# Patient Record
Sex: MALE | Race: WHITE | Employment: FULL TIME | URBAN - METROPOLITAN AREA
[De-identification: names, ages, dates, MRNs, and addresses within clinical notes are randomized per-mention and may not be internally consistent; named-entity substitution may affect disease eponyms.]

---

## 2018-11-29 ENCOUNTER — TELEPHONE (OUTPATIENT)
Dept: FAMILY MEDICINE CLINIC | Age: 38
End: 2018-11-29

## 2018-11-29 RX ORDER — AMOXICILLIN AND CLAVULANATE POTASSIUM 875; 125 MG/1; MG/1
1 TABLET, FILM COATED ORAL EVERY 12 HOURS
Qty: 20 TAB | Refills: 0 | Status: SHIPPED | OUTPATIENT
Start: 2018-11-29 | End: 2018-12-09

## 2018-11-30 NOTE — TELEPHONE ENCOUNTER
Subjective:   Chief complaint: Right ear pain and throat pain    He  is a 43 y.o. male who presents for evaluation of:  Patient presents for evaluation of ongoing right ear pain with throat pain. Symptoms initially started on 11/17/18 with fever, chills, sore throat, and myalgias. Most of the symptoms significantly improved after 1-2 days. Patient then noticed significant throat pain and ear pain a few days ago. His right ear pain was so bad that it woke him in the night. He took ibuprofen which he has not required for the past 12 years as he has a high pain tolerance. He has continued to have significant right ear pain and has been taking ibuprofen 200 mg every 3-4 hours. He continues to also have sore throat and pain with swallowing in particular. He also reports some trismus symptoms with opening his mouth and chewing. He denies any coughing, wheezing, shortness of breath, rhinorrhea, nasal congestion, headaches, weight loss, n/v/d. He does report having a few sick contacts including his son who had similar symptoms at the onset but has cleared all of his symptoms. He has been getting good rest and his energy levels have improved after his initial symptoms started. ROS  Gen - no fever/chills  Resp - no dyspnea or cough  CV - no chest pain or ALANIS  Rest per HPI    No past medical history on file. Past Surgical History:   Procedure Laterality Date    HX HEENT      Nasal surgery     No current outpatient medications on file prior to visit. No current facility-administered medications on file prior to visit. Objective:   Physical Examination:  General appearance - alert, well appearing, and in no distress  Eyes -sclera anicteric  HEENT -right TM bulging with no erythema. Left TM normal.  Posterior oropharynx with significant erythema and edema on right side as well as tonsillar swelling.   Mild trismus noted with opening mouth  Psych-normal mood and affect    Assessment/ Plan:   Pharyngitis and Tonsillitis - Given duration of sx, sig pain, and double sickening, will treat with antibiotics. Discussed side effects of Augmentin and patient agrees to use a full 10-day course. If no improvement in symptoms, patient will return for throat culture. We also discussed the episodic hoarseness that he has noticed over the past 3 years and may plan to have him see ENT for laryngoscopy. Follow-up in 10 days.

## 2018-12-19 ENCOUNTER — OFFICE VISIT (OUTPATIENT)
Dept: FAMILY MEDICINE CLINIC | Age: 38
End: 2018-12-19

## 2018-12-19 VITALS
DIASTOLIC BLOOD PRESSURE: 75 MMHG | SYSTOLIC BLOOD PRESSURE: 126 MMHG | OXYGEN SATURATION: 99 % | BODY MASS INDEX: 22.36 KG/M2 | TEMPERATURE: 96.5 F | HEART RATE: 56 BPM | WEIGHT: 151 LBS | HEIGHT: 69 IN | RESPIRATION RATE: 18 BRPM

## 2018-12-19 DIAGNOSIS — Z00.00 WELL ADULT EXAM: ICD-10-CM

## 2018-12-19 DIAGNOSIS — J03.01 RECURRENT STREPTOCOCCAL TONSILLITIS: ICD-10-CM

## 2018-12-19 DIAGNOSIS — Z22.338 STREPTOCOCCUS A CARRIER OR SUSPECTED CARRIER: ICD-10-CM

## 2018-12-19 DIAGNOSIS — Z22.338 STREPTOCOCCUS A CARRIER OR SUSPECTED CARRIER: Primary | ICD-10-CM

## 2018-12-19 NOTE — PROGRESS NOTES
Subjective:     Chief Complaint   Patient presents with   [facility] patient        He  is a 43 y.o. male who presents for evaluation of:  New pt to [facility] care. He has a hx of repetitive strep infections over the past 3 yrs. Started back in 11/2015 while traveling in [place]. Tx at Pt 1st with Augmentin 500/125 twice daily times 10 days. Then again had strep in 12/2015. Tx again with Augmentin at higher dose of 875/125 twice daily times 10 days with full resolution. Then again had pharyngitis symptoms in 2/2016 and was treated with Augmentin again at that point. Then again had an episode with pharyngitis symptoms in 11/2016he decided not to tx and symptoms resolved eventually. Lastly, he developed a right tonsillar abscess in 11/2018 with symptoms and photographs convincing for abscess. Treated this with Augmentin 875/125 twice daily times 10 days with full resolution. He went to see ENT after this and had a conversation about potentially treating patient as a carrier for strep as well as treating all of his family members. He currently has a prescription for amoxicillin 500 twice daily times 30 days but has not filled this yet as he wanted to discuss the overall plan before starting on any medications. After his visit with the ENT, his whole family had testing for strep and was + in daughter and neg in 2 boys and wife but cultures are being sent off. The pediatrician who tested his family recommended treating his whole family with Augmentin followed by a course of another antibiotic (potentially clindamycin). Patient had previously reported having some hoarseness for the past 3 years ever since his initial episode of strep started. These episodes of hoarseness seem to come and go. He briefly mentioned this with his ENT but there is no further workup recommended at that time and patient has not had a laryngoscopy.     ROS  Gen - no fever/chills  Resp - no dyspnea or cough  CV - no chest pain or ALANIS  Rest per HPI    History reviewed. No pertinent past medical history. Past Surgical History:   Procedure Laterality Date    HX HEENT      Nasal surgery     No current outpatient medications on file prior to visit. No current facility-administered medications on file prior to visit. Objective:     Vitals:    12/19/18 0841   BP: 126/75   Pulse: (!) 56   Resp: 18   Temp: 96.5 °F (35.8 °C)   TempSrc: Oral   SpO2: 99%   Weight: 151 lb (68.5 kg)   Height: 5' 9\" (1.753 m)     Physical Examination:  General appearance - alert, well appearing, and in no distress  Eyes -sclera anicteric  HEENT -TMs nml, oropharynx nml  Neck - supple, no significant adenopathy, no thyromegaly  Chest - clear to auscultation, no wheezes, rales or rhonchi, symmetric air entry  Heart - normal rate, regular rhythm, normal S1, S2, no murmurs, rubs, clicks or gallops  Neurological - alert, oriented, normal speech, no focal findings or movement disorder noted  Psychnormal mood and affect    Assessment/ Plan:   Diagnoses and all orders for this visit:    1. Streptococcus A carrier or suspected carrier  2. Recurrent streptococcal tonsillitis  We discussed his case at length and are opting to perform a throat culture today. If this comes back positive, we discussed planning for a longer course of antibiotics for him as well as considering treatment for the rest of his family. -     UPPER RESPIRATORY CULTURE    3. Well adult examobtaining labs and will plan for full exam in the future. -     CBC W/O DIFF; Future  -     METABOLIC PANEL, COMPREHENSIVE; Future  -     LIPID PANEL; Future  -     TSH 3RD GENERATION; Future    I have discussed the diagnosis with the patient and the intended plan as seen in the above orders. The patient has received an after-visit summary and questions were answered concerning future plans. I have discussed medication side effects and warnings with the patient as well.   The patient verbalizes understanding and agreement with the plan. Follow-up Disposition:  Return in about 6 months (around 6/19/2019), or if symptoms worsen or fail to improve.

## 2018-12-19 NOTE — PROGRESS NOTES
Chief Complaint   Patient presents with    New Patient    Complete Physical     1. Have you been to the ER, urgent care clinic since your last visit? Hospitalized since your last visit?no    2. Have you seen or consulted any other health care providers outside of the [address] [place] [address] since your last visit? Include any pap smears or colon screening. No    Patient mostly coming in for complete physical. Patient also would like to talk about amoxicillin that was prescribed to him from ENT.

## 2018-12-22 LAB — BACTERIA SPEC RESP CULT: NORMAL

## 2018-12-27 LAB
ALBUMIN SERPL-MCNC: 4.2 G/DL (ref 3.5–5.5)
ALBUMIN/GLOB SERPL: 1.4 {RATIO} (ref 1.2–2.2)
ALP SERPL-CCNC: 77 IU/L (ref 39–117)
ALT SERPL-CCNC: 21 IU/L (ref 0–44)
AST SERPL-CCNC: 20 IU/L (ref 0–40)
BILIRUB SERPL-MCNC: 0.4 MG/DL (ref 0–1.2)
BUN SERPL-MCNC: 20 MG/DL (ref 6–20)
BUN/CREAT SERPL: 21 (ref 9–20)
CALCIUM SERPL-MCNC: 9.6 MG/DL (ref 8.7–10.2)
CHLORIDE SERPL-SCNC: 101 MMOL/L (ref 96–106)
CHOLEST SERPL-MCNC: 150 MG/DL (ref 100–199)
CO2 SERPL-SCNC: 27 MMOL/L (ref 20–29)
CREAT SERPL-MCNC: 0.97 MG/DL (ref 0.76–1.27)
ERYTHROCYTE [DISTWIDTH] IN BLOOD BY AUTOMATED COUNT: 12.9 % (ref 12.3–15.4)
GLOBULIN SER CALC-MCNC: 3 G/DL (ref 1.5–4.5)
GLUCOSE SERPL-MCNC: 104 MG/DL (ref 65–99)
HCT VFR BLD AUTO: 41.4 % (ref 37.5–51)
HDLC SERPL-MCNC: 42 MG/DL
HGB BLD-MCNC: 13.8 G/DL (ref 13–17.7)
LDLC SERPL CALC-MCNC: 94 MG/DL (ref 0–99)
MCH RBC QN AUTO: 28.3 PG (ref 26.6–33)
MCHC RBC AUTO-ENTMCNC: 33.3 G/DL (ref 31.5–35.7)
MCV RBC AUTO: 85 FL (ref 79–97)
PLATELET # BLD AUTO: 183 X10E3/UL (ref 150–379)
POTASSIUM SERPL-SCNC: 5 MMOL/L (ref 3.5–5.2)
PROT SERPL-MCNC: 7.2 G/DL (ref 6–8.5)
RBC # BLD AUTO: 4.87 X10E6/UL (ref 4.14–5.8)
SODIUM SERPL-SCNC: 142 MMOL/L (ref 134–144)
TRIGL SERPL-MCNC: 72 MG/DL (ref 0–149)
TSH SERPL DL<=0.005 MIU/L-ACNC: 1.77 UIU/ML (ref 0.45–4.5)
VLDLC SERPL CALC-MCNC: 14 MG/DL (ref 5–40)
WBC # BLD AUTO: 5.4 X10E3/UL (ref 3.4–10.8)

## 2019-06-05 ENCOUNTER — OFFICE VISIT (OUTPATIENT)
Dept: FAMILY MEDICINE CLINIC | Age: 39
End: 2019-06-05

## 2019-06-05 VITALS
WEIGHT: 149 LBS | HEIGHT: 69 IN | DIASTOLIC BLOOD PRESSURE: 74 MMHG | BODY MASS INDEX: 22.07 KG/M2 | HEART RATE: 56 BPM | TEMPERATURE: 97.2 F | RESPIRATION RATE: 16 BRPM | OXYGEN SATURATION: 98 % | SYSTOLIC BLOOD PRESSURE: 114 MMHG

## 2019-06-05 DIAGNOSIS — J02.9 PHARYNGITIS, UNSPECIFIED ETIOLOGY: Primary | ICD-10-CM

## 2019-06-05 DIAGNOSIS — J03.01 RECURRENT STREPTOCOCCAL TONSILLITIS: ICD-10-CM

## 2019-06-05 LAB
S PYO AG THROAT QL: NEGATIVE
VALID INTERNAL CONTROL?: YES

## 2019-06-05 NOTE — PROGRESS NOTES
Subjective:     Chief Complaint   Patient presents with    Sore Throat      He  is a 35 y.o. male who presents for evaluation of:  Patient reports sore and dry throat x1 week. No significant fever or chills. It initially seemed to get a little bit better but then did not improve. He especially notes pain with swallowing and eating but the pain can be present at rest.  He also notes some left-sided mildly tender lymphadenopathy. Denies any sick contacts with similar symptoms but numerous family members did have a gastroenteritis that lasted nearly a week. He has not been using any NSAIDs. His main concern is with having right peritonsillar abscess in 11/2018 and wanting to make sure that this does not turn into another abscess. ROS  Gen - no fever/chills  Resp - no dyspnea or cough  CV - no chest pain or ALANIS  Rest per HPI    History reviewed. No pertinent past medical history. Past Surgical History:   Procedure Laterality Date    HX HEENT      Nasal surgery     No current outpatient medications on file prior to visit. No current facility-administered medications on file prior to visit.          Objective:     Vitals:    06/05/19 1531   BP: 114/74   Pulse: (!) 56   Resp: 16   Temp: 97.2 °F (36.2 °C)   TempSrc: Oral   SpO2: 98%   Weight: 149 lb (67.6 kg)   Height: 5' 9\" (1.753 m)     Physical Examination:  General appearance - alert, well appearing, and in no distress  Eyes -sclera anicteric  HEENT -TMs nml bilaterally, oropharynx with tonsillar swelling especially on the left side and a white ulcerated patch noted on the lower portion of the left tonsil  Neck - supple, left submandibular adenopathy with tenderness, no thyromegaly  Chest - clear to auscultation, no wheezes, rales or rhonchi, symmetric air entry  Heart - normal rate, regular rhythm, normal S1, S2, no murmurs, rubs, clicks or gallops  Neurological - alert, oriented, normal speech, no focal findings or movement disorder noted  Psych-normal mood and affect    Assessment/ Plan:   Diagnoses and all orders for this visit:    1. Pharyngitis, unspecified etiology  2. Recurrent streptococcal tonsillitis  -Suspect symptoms are viral but given patient's history of a recurrent strep infection, we have obtained a rapid strep test which was negative and sent culture which is still pending at the time of this note. We discussed symptomatic care with salt water gargles, NSAIDs as needed, and getting plenty of rest and fluids. -     UPPER RESPIRATORY CULTURE  -     AMB POC RAPID STREP A    I have discussed the diagnosis with the patient and the intended plan as seen in the above orders. The patient has received an after-visit summary and questions were answered concerning future plans. I have discussed medication side effects and warnings with the patient as well. The patient verbalizes understanding and agreement with the plan. Follow-up and Dispositions    · Return if symptoms worsen or fail to improve.

## 2019-06-08 LAB — BACTERIA SPEC RESP CULT: NORMAL

## 2021-09-08 ENCOUNTER — OFFICE VISIT (OUTPATIENT)
Dept: FAMILY MEDICINE CLINIC | Age: 41
End: 2021-09-08
Payer: COMMERCIAL

## 2021-09-08 VITALS
SYSTOLIC BLOOD PRESSURE: 106 MMHG | WEIGHT: 149.4 LBS | DIASTOLIC BLOOD PRESSURE: 65 MMHG | BODY MASS INDEX: 22.13 KG/M2 | OXYGEN SATURATION: 99 % | HEIGHT: 69 IN | HEART RATE: 56 BPM | RESPIRATION RATE: 16 BRPM | TEMPERATURE: 97.2 F

## 2021-09-08 DIAGNOSIS — F45.8 BRUXISM: ICD-10-CM

## 2021-09-08 DIAGNOSIS — Z80.42 FAMILY HISTORY OF PROSTATE CARCINOMA: ICD-10-CM

## 2021-09-08 DIAGNOSIS — Z00.00 WELL ADULT EXAM: Primary | ICD-10-CM

## 2021-09-08 DIAGNOSIS — Z23 ENCOUNTER FOR IMMUNIZATION: ICD-10-CM

## 2021-09-08 PROCEDURE — 90715 TDAP VACCINE 7 YRS/> IM: CPT | Performed by: FAMILY MEDICINE

## 2021-09-08 PROCEDURE — 90471 IMMUNIZATION ADMIN: CPT | Performed by: FAMILY MEDICINE

## 2021-09-08 PROCEDURE — 99396 PREV VISIT EST AGE 40-64: CPT | Performed by: FAMILY MEDICINE

## 2021-09-08 NOTE — PATIENT INSTRUCTIONS
Vaccine Information Statement    Tdap (Tetanus, Diphtheria, Pertussis) Vaccine: What you need to know     Many Vaccine Information Statements are available in Kiswahili and other languages. See www.immunize.org/vis  Hojas de información sobre vacunas están disponibles en español y en muchos otros idiomas. Visite www.immunize.org/vis    1. Why get vaccinated? Tdap vaccine can prevent tetanus, diphtheria, and pertussis. Diphtheria and pertussis spread from person to person. Tetanus enters the body through cuts or wounds.  TETANUS (T) causes painful stiffening of the muscles. Tetanus can lead to serious health problems, including being unable to open the mouth, having trouble swallowing and breathing, or death.  DIPHTHERIA (D) can lead to difficulty breathing, heart failure, paralysis, or death.  PERTUSSIS (aP), also known as whooping cough, can cause uncontrollable, violent coughing which makes it hard to breathe, eat, or drink. Pertussis can be extremely serious in babies and young children, causing pneumonia, convulsions, brain damage, or death. In teens and adults, it can cause weight loss, loss of bladder control, passing out, and rib fractures from severe coughing. 2. Tdap vaccine     Tdap is only for children 7 years and older, adolescents, and adults. Adolescents should receive a single dose of Tdap, preferably at age 6 or 15 years. Pregnant women should get a dose of Tdap during every pregnancy, to protect the  from pertussis. Infants are most at risk for severe, life-threatening complications from pertussis. Adults who have never received Tdap should get a dose of Tdap. Also, adults should receive a booster dose every 10 years, or earlier in the case of a severe and dirty wound or burn. Booster doses can be either Tdap or Td (a different vaccine that protects against tetanus and diphtheria but not pertussis).      Tdap may be given at the same time as other vaccines. 3. Talk with your health care provider    Tell your vaccine provider if the person getting the vaccine:   Has had an allergic reaction after a previous dose of any vaccine that protects against tetanus, diphtheria, or pertussis, or has any severe, life-threatening allergies.  Has had a coma, decreased level of consciousness, or prolonged seizures within 7 days after a previous dose of any pertussis vaccine (DTP, DTaP, or Tdap).  Has seizures or another nervous system problem.  Has ever had Guillain-Barré Syndrome (also called GBS).  Has had severe pain or swelling after a previous dose of any vaccine that protects against tetanus or diphtheria. In some cases, your health care provider may decide to postpone Tdap vaccination to a future visit. People with minor illnesses, such as a cold, may be vaccinated. People who are moderately or severely ill should usually wait until they recover before getting Tdap vaccine. Your health care provider can give you more information. 4. Risks of a vaccine reaction     Pain, redness, or swelling where the shot was given, mild fever, headache, feeling tired, and nausea, vomiting, diarrhea, or stomachache sometimes happen after Tdap vaccine. People sometimes faint after medical procedures, including vaccination. Tell your provider if you feel dizzy or have vision changes or ringing in the ears. As with any medicine, there is a very remote chance of a vaccine causing a severe allergic reaction, other serious injury, or death. 5. What if there is a serious problem? An allergic reaction could occur after the vaccinated person leaves the clinic. If you see signs of a severe allergic reaction (hives, swelling of the face and throat, difficulty breathing, a fast heartbeat, dizziness, or weakness), call 9-1-1 and get the person to the nearest hospital.    For other signs that concern you, call your health care provider.     Adverse reactions should be reported to the Vaccine Adverse Event Reporting System (VAERS). Your health care provider will usually file this report, or you can do it yourself. Visit the VAERS website at www.vaers. [facility].gov or call 3-438.397.7919. VAERS is only for reporting reactions, and VAERS staff do not give medical advice. 6. The National Vaccine Injury Compensation Program    The [facility] Vaccine Injury Compensation Program (VICP) is a federal program that was created to compensate people who may have been injured by certain vaccines. Visit the VICP website at www.[facility].gov/vaccinecompensation or call 5-405-467-3005 to learn about the program and about filing a claim. There is a time limit to file a claim for compensation. 7. How can I learn more?  Ask your health care provider.  Call your local or state health department.  Contact the Centers for Disease Control and Prevention (CDC):  - Call 2-935.638.1755 (1-800-CDC-INFO) or  - Visit CDCs website at www.cdc.gov/vaccines    Vaccine Information Statement (Interim)  Tdap (Tetanus, Diphtheria, Pertussis) Vaccine  04/01/2020  42 DEVIKA Diggs [id number]   Department of Health and Human Services  Centers for Disease Control and Prevention    Office Use Only

## 2021-09-08 NOTE — PROGRESS NOTES
Alis Wise (: 1980) is a 42 y.o. male, established patient, here for evaluation of the following chief complaint(s):  Complete Physical (Annual)       ASSESSMENT/PLAN: Doing well overall. Checking labs. Encouraged continued monitoring of episodic lymphadenopathy. If any lymph nodes are present for prolonged period, would consider FNA. Checking PSA given family history of prostate cancer in father at the age of 62. Using  for bruxism. He is COVID vaccinated with [employer]. Below is the assessment and plan developed based on review of pertinent history, physical exam, labs, studies, and medications. 1. Well adult exam  -     HEMOGLOBIN A1C WITH EAG; Future  -     LIPID PANEL; Future  -     METABOLIC PANEL, COMPREHENSIVE; Future  -     TSH 3RD GENERATION; Future  -     CBC W/O DIFF; Future  -     URINALYSIS W/ RFLX MICROSCOPIC; Future  -     PSA, DIAGNOSTIC (PROSTATE SPECIFIC AG); Future  2. Encounter for immunization  -     TETANUS, DIPHTHERIA TOXOIDS AND ACELLULAR PERTUSSIS VACCINE (TDAP), IN INDIVIDS. >=7, IM  3. Family history of prostate carcinoma  4. Bruxism      Return in about 1 year (around 2022), or if symptoms worsen or fail to improve. SUBJECTIVE/OBJECTIVE:  He  is a 42 y.o. male who presents for evaluation of: CPE    Doing well today. No complaints. Exercising regularly and eats a healthy diet. Physically feels well. Reviewed last labs in 2018 with no concerns. Has done a lot of traveling with sailing over the past year. R jaw angle with some pain x 1 day. Has had episodic submandibular lymphadenopathy. No sx including fevers, sore throat, rhinorrhea. Fhx of prostate cancer in Dad. Dad also have melanoma (scalp) and then had SCC from base of tongue which he eventually passed away from.     ROS:  Constitutional: negative for fevers, chills and fatigue  Eyes: negative for visual disturbance  Ears, nose, mouth, throat, and face: negative for hearing loss and sore throat  Respiratory: negative for cough or dyspnea on exertion  Cardiovascular: negative for chest pain, dyspnea, palpitations, fatigue  Gastrointestinal: negative for nausea, vomiting, change in bowel habits, diarrhea and abdominal pain  Genitourinary:negative for frequency and dysuria  Integument/breast: History of lipomas and following with dermatology  Hematologic/lymphatic: negative for easy bruising and bleeding  Musculoskeletal:negative for myalgias and muscle weakness. Getting some episodes of neck soreness with sleeping. 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 Found to have some mild nerve impingement. Can be bilat or more on right side.   Neurological: negative for headaches and dizziness  Behavioral/Psych: negative for anxiety and depression     Objective:     Vitals:    09/08/21 1004   BP: 106/65   Pulse: (!) 56   Resp: 16   Temp: 97.2 °F (36.2 °C)   TempSrc: Oral   SpO2: 99%   Weight: 149 lb 6.4 oz (67.8 kg)   Height: 5' 9\" (1.753 m)     Physical Examination:  General appearance - alert, well appearing, and in no distress  Ears - bilat nml TMs  Eyes - sclera anicteric  Nose - normal and patent, no erythema, discharge or polyps  Mouth - mucous membranes moist, pharynx normal without lesions  Neck - supple, no significant adenopathy  Lymphatics - no palpable lymphadenopathy, no hepatosplenomegaly  Chest - clear to auscultation, no wheezes, rales or rhonchi, symmetric air entry  Heart - normal rate, regular rhythm, normal S1, S2, no murmurs, rubs, clicks or gallops  Abdomen - soft, nontender, nondistended, no masses or organomegaly   - not indicated  Neurological - alert, oriented, normal speech, no focal findings or movement disorder noted  Musculoskeletal - no joint tenderness, deformity or swelling  Extremities - no edema  Skin -right posterior thigh lipoma stable, left-sided upper lateral back with small lipoma/cyst  Psych - nml mood and affect    History reviewed. No pertinent past medical history. Past Surgical History:   Procedure Laterality Date    HX HEENT      Nasal surgery     No current outpatient medications on file prior to visit. No current facility-administered medications on file prior to visit. An electronic signature was used to authenticate this note.   -- Joe Starr MD

## 2021-11-30 DIAGNOSIS — R59.0 LAD (LYMPHADENOPATHY), SUBMANDIBULAR: Primary | ICD-10-CM

## 2021-12-02 ENCOUNTER — OFFICE VISIT (OUTPATIENT)
Dept: FAMILY MEDICINE CLINIC | Age: 41
End: 2021-12-02
Payer: COMMERCIAL

## 2021-12-02 VITALS
TEMPERATURE: 97.5 F | OXYGEN SATURATION: 98 % | RESPIRATION RATE: 16 BRPM | HEART RATE: 69 BPM | HEIGHT: 69 IN | DIASTOLIC BLOOD PRESSURE: 63 MMHG | SYSTOLIC BLOOD PRESSURE: 131 MMHG | WEIGHT: 149.2 LBS | BODY MASS INDEX: 22.1 KG/M2

## 2021-12-02 DIAGNOSIS — R59.0 SUBMANDIBULAR LYMPHADENOPATHY: Primary | ICD-10-CM

## 2021-12-02 PROCEDURE — 99213 OFFICE O/P EST LOW 20 MIN: CPT | Performed by: FAMILY MEDICINE

## 2021-12-02 RX ORDER — AMOXICILLIN AND CLAVULANATE POTASSIUM 875; 125 MG/1; MG/1
1 TABLET, FILM COATED ORAL 2 TIMES DAILY
Qty: 20 TABLET | Refills: 0 | Status: SHIPPED | OUTPATIENT
Start: 2021-12-02 | End: 2021-12-12

## 2021-12-02 NOTE — PROGRESS NOTES
Jennifer Lucas (: 1980) is a 42 y.o. male, established patient, here for evaluation of the following chief complaint(s):  No chief complaint on file. ASSESSMENT/PLAN: Doing well overall but ongoing lymphadenopathy. Obtained throat culture today and will treat with Augmentin in the case that this may be infectious given recurrent episodes of pharyngitis in the past.  No clear symptoms on history otherwise. If lymphadenopathy does not improve with antibiotics or if throat culture is unrevealing, will have him move forward with getting FNA. Below is the assessment and plan developed based on review of pertinent history, physical exam, labs, studies, and medications. 1. Submandibular lymphadenopathy  -     US GUIDE FINE NDL ASP W IMAGE; Future  -     CULTURE, THROAT; Future  -     amoxicillin-clavulanate (AUGMENTIN) 875-125 mg per tablet; Take 1 Tablet by mouth two (2) times a day for 10 days. , Normal, Disp-20 Tablet, R-0      Return in about 3 months (around 3/2/2022), or if symptoms worsen or fail to improve. SUBJECTIVE/OBJECTIVE:    Episodic submandibular lymphadenopathy has become more persistent and has continued to be present for 4 to 5 months. No sx including fevers, sore throat, rhinorrhea, or ear pain. Denies any redness, skin infections, drainage. Denies any breathing issues or coughing. Does have a past medical history of episodic recurrence throat infections with occasional strep throat. Has seen ENT for this in the past.  Patient does not have any significant oral cancer risk factors outside of his family history. Non-smoker and drinks alcohol rarely. Fhx of prostate cancer in Dad. Dad also have melanoma (scalp) and then had SCC from base of tongue which he eventually passed away from.     ROS  Gen - no fever/chills  Resp - no dyspnea or cough  CV - no chest pain or ALANIS  Rest per HPI    Blood pressure 131/63, pulse 69, temperature 97.5 °F (36.4 °C), temperature source Oral, resp. rate 16, height 5' 9\" (1.753 m), weight 149 lb 3.2 oz (67.7 kg), SpO2 98 %. Physical Exam  General appearance - alert, well appearing, and in no distress  Eyes -sclera anicteric  Neck - supple, 1 mobile left-sided submandibular lymph node, 1 mobile left-sided anterior cervical lymph node, 1 barely palpable right-sided submandibular lymph node, and 1 mobile right-sided anterior cervical lymph node present  Chest - clear to auscultation, no wheezes, rales or rhonchi, symmetric air entry  Heart - normal rate, regular rhythm, normal S1, S2, no murmurs, rubs, clicks or gallops  Psych - normal mood and affect    On this date 12/02/2021 I have spent 20 minutes reviewing previous notes, test results and face to face with the patient discussing the diagnosis and importance of compliance with the treatment plan as well as documenting on the day of the visit. An electronic signature was used to authenticate this note.   -- Alondra Rivera MD

## 2021-12-06 LAB
BACTERIA SPEC RESP CULT: NORMAL
SPECIMEN STATUS REPORT, ROLRST: NORMAL

## 2022-01-03 ENCOUNTER — HOSPITAL ENCOUNTER (OUTPATIENT)
Dept: ULTRASOUND IMAGING | Age: 42
Discharge: HOME | End: 2022-01-03
Attending: FAMILY MEDICINE

## 2022-01-03 DIAGNOSIS — R59.0 LAD (LYMPHADENOPATHY), SUBMANDIBULAR: ICD-10-CM

## 2022-01-03 DIAGNOSIS — R59.0 SUBMANDIBULAR LYMPHADENOPATHY: ICD-10-CM

## 2022-01-03 PROCEDURE — 88172 CYTP DX EVAL FNA 1ST EA SITE: CPT

## 2022-01-03 PROCEDURE — 10005 FNA BX W/US GDN 1ST LES: CPT

## 2022-01-03 PROCEDURE — 88185 FLOWCYTOMETRY/TC ADD-ON: CPT

## 2022-01-03 PROCEDURE — 76536 US EXAM OF HEAD AND NECK: CPT

## 2022-01-03 PROCEDURE — 88184 FLOWCYTOMETRY/ TC 1 MARKER: CPT

## 2022-01-03 PROCEDURE — 88173 CYTOPATH EVAL FNA REPORT: CPT

## 2022-01-03 RX ORDER — LIDOCAINE HYDROCHLORIDE 10 MG/ML
5 INJECTION, SOLUTION EPIDURAL; INFILTRATION; INTRACAUDAL; PERINEURAL
Status: COMPLETED | OUTPATIENT
Start: 2022-01-03 | End: 2022-01-03

## 2022-01-03 RX ADMIN — LIDOCAINE HYDROCHLORIDE 5 ML: 10 INJECTION, SOLUTION EPIDURAL; INFILTRATION; INTRACAUDAL; PERINEURAL at 10:30

## 2023-02-22 ENCOUNTER — OFFICE VISIT (OUTPATIENT)
Dept: FAMILY MEDICINE CLINIC | Age: 43
End: 2023-02-22
Payer: COMMERCIAL

## 2023-02-22 VITALS
BODY MASS INDEX: 21.98 KG/M2 | TEMPERATURE: 97.3 F | HEART RATE: 57 BPM | OXYGEN SATURATION: 100 % | SYSTOLIC BLOOD PRESSURE: 106 MMHG | WEIGHT: 148.4 LBS | HEIGHT: 69 IN | DIASTOLIC BLOOD PRESSURE: 68 MMHG | RESPIRATION RATE: 16 BRPM

## 2023-02-22 DIAGNOSIS — Z00.00 WELL ADULT EXAM: Primary | ICD-10-CM

## 2023-02-22 DIAGNOSIS — Z80.42 FAMILY HISTORY OF PROSTATE CARCINOMA: ICD-10-CM

## 2023-02-22 PROCEDURE — 99396 PREV VISIT EST AGE 40-64: CPT | Performed by: FAMILY MEDICINE

## 2023-02-22 NOTE — PROGRESS NOTES
Chief Complaint   Patient presents with    Complete Physical     Annual    1. Have you been to the ER, urgent care clinic since your last visit? Hospitalized since your last visit? No    2. Have you seen or consulted any other health care providers outside of the [address] [place] [address] since your last visit? Include any pap smears or colon screening.  No

## 2023-02-22 NOTE — PROGRESS NOTES
Jimena Raymundo (: 1980) is a 36 y.o. male, established patient, here for evaluation of the following chief complaint(s):  Complete Physical (Annual)       ASSESSMENT/PLAN:   Below is the assessment and plan developed based on review of pertinent history, physical exam, labs, studies, and medications. 1. Well adult exam-doing well overall with no major concerns. Continues to monitor lipomas. Reviewed newer family history of neuroendocrine tumor. I am unaware of any routine screening required for Fhx of NETs at this time and patient to inquire with mother's oncologist as well. Encouraged continued work on exercise and diet  -     LIPID PANEL; Future  -     METABOLIC PANEL, COMPREHENSIVE; Future  -     CBC W/O DIFF; Future  -     URINALYSIS W/ RFLX MICROSCOPIC; Future  -     PSA, DIAGNOSTIC (PROSTATE SPECIFIC AG); Future    2. Family history of prostate carcinoma-checking PSA given family history of prostate cancer in father at the age of 62  -     PSA, DIAGNOSTIC (PROSTATE SPECIFIC AG); Future      Return in about 2 years (around 2025). 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 No complaints. Exercising regularly and eats a healthy diet. Reviewed last labs in  with no concerns. Fhx of prostate cancer in Dad. Dad also have melanoma (scalp) and then had SCC from base of tongue which he eventually passed away from. Mom recently diagnosed with neuroendocrine tumor of the mesentery and requiring a colon surgery as part of treatment. She is planning to have another review at [facility] or [facility].     ROS:  Constitutional: negative for fevers, chills and fatigue  Eyes: negative for visual disturbance  Ears, nose, mouth, throat, and face: negative for hearing loss and sore throat  Respiratory: negative for cough or dyspnea on exertion  Cardiovascular: negative for chest pain, dyspnea, palpitations, fatigue  Gastrointestinal: noted mild episodic diarrhea that seems to be set off by fat in diet. Better when avoiding. Denies any significant weight loss or blood in stools. May occur about once every 4 weeks. Has had since childhood and was initially thought to have a wheat allergy. Patient has done elimination diets over time and not noticed any improvement with elimination of wheat or lactose but has had significant improvement when cutting back on fatty foods (especially notices with eating out at [address]). Genitourinary:negative for frequency and dysuria  Integument/breast: History of lipomas and has seen dermatology for this in the past  Hematologic/lymphatic: negative for easy bruising and bleeding  Musculoskeletal:negative for myalgias and muscle weakness. 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 Found to have some mild nerve impingement. Neurological: negative for headaches and dizziness  Behavioral/Psych: negative for anxiety and depression    History reviewed. No pertinent past medical history. Past Surgical History:   Procedure Laterality Date    HX HEENT      Nasal surgery       Objective:     Blood pressure 106/68, pulse (!) 57, temperature 97.3 °F (36.3 °C), temperature source Temporal, resp. rate 16, height 5' 9\" (1.753 m), weight 148 lb 6.4 oz (67.3 kg), SpO2 100 %.     Physical Examination:  General appearance - alert, well appearing, and in no distress  Ears -right TM normal and left TM obstructed by cerumen that was easily removed by irrigation by my nurse  Eyes - sclera anicteric  Mouth - mucous membranes moist, mild posterior oropharynx erythema, no exudate  Neck - supple, no significant adenopathy  Chest - clear to auscultation, no wheezes, rales or rhonchi, symmetric air entry  Heart - normal rate, regular rhythm, normal S1, S2, no murmurs, rubs, clicks or gallops  Abdomen - soft, nontender, nondistended, no masses or organomegaly   - not indicated  Neurological - alert, oriented, normal speech, no focal findings or movement disorder noted  Skin-+ lipomas   Extremities - no edema  Psych - nml mood and affect    An electronic signature was used to authenticate this note.   -- Tucker Glaser MD

## 2023-02-23 LAB
ALBUMIN SERPL-MCNC: 4.9 G/DL (ref 4–5)
ALBUMIN/GLOB SERPL: 2 {RATIO} (ref 1.2–2.2)
ALP SERPL-CCNC: 68 IU/L (ref 44–121)
ALT SERPL-CCNC: 25 IU/L (ref 0–44)
APPEARANCE UR: CLEAR
AST SERPL-CCNC: 22 IU/L (ref 0–40)
BILIRUB SERPL-MCNC: 0.8 MG/DL (ref 0–1.2)
BILIRUB UR QL STRIP: NEGATIVE
BUN SERPL-MCNC: 22 MG/DL (ref 6–24)
BUN/CREAT SERPL: 20 (ref 9–20)
CALCIUM SERPL-MCNC: 10.2 MG/DL (ref 8.7–10.2)
CHLORIDE SERPL-SCNC: 102 MMOL/L (ref 96–106)
CHOLEST SERPL-MCNC: 161 MG/DL (ref 100–199)
CO2 SERPL-SCNC: 26 MMOL/L (ref 20–29)
COLOR UR: YELLOW
CREAT SERPL-MCNC: 1.11 MG/DL (ref 0.76–1.27)
EGFRCR SERPLBLD CKD-EPI 2021: 84 ML/MIN/1.73
ERYTHROCYTE [DISTWIDTH] IN BLOOD BY AUTOMATED COUNT: 12.6 % (ref 11.6–15.4)
GLOBULIN SER CALC-MCNC: 2.4 G/DL (ref 1.5–4.5)
GLUCOSE SERPL-MCNC: 85 MG/DL (ref 70–99)
GLUCOSE UR QL STRIP: NEGATIVE
HCT VFR BLD AUTO: 46.3 % (ref 37.5–51)
HDLC SERPL-MCNC: 46 MG/DL
HGB BLD-MCNC: 15.5 G/DL (ref 13–17.7)
HGB UR QL STRIP: NEGATIVE
KETONES UR QL STRIP: NEGATIVE
LDLC SERPL CALC-MCNC: 101 MG/DL (ref 0–99)
LEUKOCYTE ESTERASE UR QL STRIP: NEGATIVE
MCH RBC QN AUTO: 28.9 PG (ref 26.6–33)
MCHC RBC AUTO-ENTMCNC: 33.5 G/DL (ref 31.5–35.7)
MCV RBC AUTO: 86 FL (ref 79–97)
MICRO URNS: NORMAL
NITRITE UR QL STRIP: NEGATIVE
PH UR STRIP: 6.5 [PH] (ref 5–7.5)
PLATELET # BLD AUTO: 205 X10E3/UL (ref 150–450)
POTASSIUM SERPL-SCNC: 4.9 MMOL/L (ref 3.5–5.2)
PROT SERPL-MCNC: 7.3 G/DL (ref 6–8.5)
PROT UR QL STRIP: NEGATIVE
PSA SERPL-MCNC: 0.9 NG/ML (ref 0–4)
RBC # BLD AUTO: 5.37 X10E6/UL (ref 4.14–5.8)
SODIUM SERPL-SCNC: 140 MMOL/L (ref 134–144)
SP GR UR STRIP: 1.01 (ref 1–1.03)
TRIGL SERPL-MCNC: 71 MG/DL (ref 0–149)
UROBILINOGEN UR STRIP-MCNC: 0.2 MG/DL (ref 0.2–1)
VLDLC SERPL CALC-MCNC: 14 MG/DL (ref 5–40)
WBC # BLD AUTO: 5 X10E3/UL (ref 3.4–10.8)